# Patient Record
Sex: FEMALE | Race: BLACK OR AFRICAN AMERICAN | Employment: UNEMPLOYED | URBAN - METROPOLITAN AREA
[De-identification: names, ages, dates, MRNs, and addresses within clinical notes are randomized per-mention and may not be internally consistent; named-entity substitution may affect disease eponyms.]

---

## 2018-07-26 ENCOUNTER — OP HISTORICAL/CONVERTED ENCOUNTER (OUTPATIENT)
Dept: OTHER | Age: 73
End: 2018-07-26

## 2019-02-10 ENCOUNTER — ED HISTORICAL/CONVERTED ENCOUNTER (OUTPATIENT)
Dept: OTHER | Age: 74
End: 2019-02-10

## 2019-05-09 ENCOUNTER — IP HISTORICAL/CONVERTED ENCOUNTER (OUTPATIENT)
Dept: OTHER | Age: 74
End: 2019-05-09

## 2019-09-13 ENCOUNTER — ED HISTORICAL/CONVERTED ENCOUNTER (OUTPATIENT)
Dept: OTHER | Age: 74
End: 2019-09-13

## 2020-01-01 ENCOUNTER — OP HISTORICAL/CONVERTED ENCOUNTER (OUTPATIENT)
Dept: OTHER | Age: 75
End: 2020-01-01

## 2020-01-01 ENCOUNTER — APPOINTMENT (OUTPATIENT)
Dept: GENERAL RADIOLOGY | Age: 75
End: 2020-01-01
Attending: EMERGENCY MEDICINE
Payer: MEDICARE

## 2020-01-01 ENCOUNTER — ANESTHESIA (OUTPATIENT)
Dept: CARDIAC CATH/INVASIVE PROCEDURES | Age: 75
End: 2020-01-01
Payer: MEDICARE

## 2020-01-01 ENCOUNTER — OFFICE VISIT (OUTPATIENT)
Dept: SURGERY | Age: 75
End: 2020-01-01
Payer: MEDICARE

## 2020-01-01 ENCOUNTER — HOSPITAL ENCOUNTER (OUTPATIENT)
Age: 75
End: 2020-11-15
Attending: EMERGENCY MEDICINE | Admitting: HOSPITALIST
Payer: MEDICARE

## 2020-01-01 ENCOUNTER — ANESTHESIA EVENT (OUTPATIENT)
Dept: CARDIAC CATH/INVASIVE PROCEDURES | Age: 75
End: 2020-01-01
Payer: MEDICARE

## 2020-01-01 ENCOUNTER — HOSPITAL ENCOUNTER (EMERGENCY)
Age: 75
Discharge: ANOTHER HOSPITAL | End: 2020-11-15
Attending: EMERGENCY MEDICINE
Payer: MEDICARE

## 2020-01-01 VITALS
TEMPERATURE: 98 F | HEIGHT: 65 IN | SYSTOLIC BLOOD PRESSURE: 140 MMHG | WEIGHT: 154 LBS | DIASTOLIC BLOOD PRESSURE: 84 MMHG | BODY MASS INDEX: 25.66 KG/M2 | HEART RATE: 70 BPM | OXYGEN SATURATION: 98 %

## 2020-01-01 VITALS
OXYGEN SATURATION: 99 % | TEMPERATURE: 99 F | RESPIRATION RATE: 18 BRPM | WEIGHT: 155 LBS | DIASTOLIC BLOOD PRESSURE: 50 MMHG | HEART RATE: 84 BPM | SYSTOLIC BLOOD PRESSURE: 69 MMHG

## 2020-01-01 VITALS — SYSTOLIC BLOOD PRESSURE: 141 MMHG | HEART RATE: 87 BPM | RESPIRATION RATE: 16 BRPM | DIASTOLIC BLOOD PRESSURE: 115 MMHG

## 2020-01-01 DIAGNOSIS — I65.29 STENOSIS OF CAROTID ARTERY, UNSPECIFIED LATERALITY: Primary | ICD-10-CM

## 2020-01-01 DIAGNOSIS — I21.3 ST ELEVATION MYOCARDIAL INFARCTION (STEMI), UNSPECIFIED ARTERY (HCC): ICD-10-CM

## 2020-01-01 DIAGNOSIS — I21.11 ACUTE ST ELEVATION MYOCARDIAL INFARCTION (STEMI) INVOLVING RIGHT CORONARY ARTERY (HCC): Primary | ICD-10-CM

## 2020-01-01 LAB
ALBUMIN SERPL-MCNC: 3.3 G/DL (ref 3.5–5)
ALBUMIN/GLOB SERPL: 1 {RATIO} (ref 1.1–2.2)
ALP SERPL-CCNC: 95 U/L (ref 45–117)
ALT SERPL-CCNC: 22 U/L (ref 12–78)
ANION GAP SERPL CALC-SCNC: 13 MMOL/L (ref 5–15)
APTT PPP: 140.9 SEC (ref 23–35.7)
AST SERPL W P-5'-P-CCNC: 24 U/L (ref 15–37)
BASOPHILS # BLD: 0 K/UL (ref 0–0.1)
BASOPHILS # BLD: 0.1 K/UL (ref 0–0.2)
BASOPHILS NFR BLD: 0 % (ref 0–1)
BASOPHILS NFR BLD: 1 % (ref 0–2.5)
BILIRUB SERPL-MCNC: 0.2 MG/DL (ref 0.2–1)
BNP SERPL-MCNC: 388 PG/ML
BNP SERPL-MCNC: 392 PG/ML
BUN SERPL-MCNC: 16 MG/DL (ref 6–20)
BUN/CREAT SERPL: 9 (ref 12–20)
CA-I BLD-MCNC: 9 MG/DL (ref 8.5–10.1)
CHLORIDE SERPL-SCNC: 103 MMOL/L (ref 97–108)
CO2 SERPL-SCNC: 22 MMOL/L (ref 21–32)
CREAT SERPL-MCNC: 1.7 MG/DL (ref 0.55–1.02)
DIFFERENTIAL METHOD BLD: ABNORMAL
EOSINOPHIL # BLD: 0.1 K/UL (ref 0–0.4)
EOSINOPHIL # BLD: 0.1 K/UL (ref 0–0.7)
EOSINOPHIL NFR BLD: 1 % (ref 0–7)
EOSINOPHIL NFR BLD: 2 % (ref 0.9–2.9)
ERYTHROCYTE [DISTWIDTH] IN BLOOD BY AUTOMATED COUNT: 13.5 % (ref 11.5–14.5)
ERYTHROCYTE [DISTWIDTH] IN BLOOD BY AUTOMATED COUNT: 13.5 % (ref 11.5–14.5)
GLOBULIN SER CALC-MCNC: 3.4 G/DL (ref 2–4)
GLUCOSE SERPL-MCNC: 213 MG/DL (ref 65–100)
HCT VFR BLD AUTO: 32.7 % (ref 35–47)
HCT VFR BLD AUTO: 36.1 % (ref 36–46)
HGB BLD-MCNC: 10 G/DL (ref 11.5–16)
HGB BLD-MCNC: 11.6 G/DL (ref 13.5–17.5)
IMM GRANULOCYTES # BLD AUTO: 0 K/UL (ref 0–0.04)
IMM GRANULOCYTES NFR BLD AUTO: 0 % (ref 0–0.5)
INR PPP: 1 (ref 0.9–1.1)
INR PPP: 1.1 (ref 0.9–1.1)
LYMPHOCYTES # BLD: 3.4 K/UL (ref 0.8–3.5)
LYMPHOCYTES # BLD: 3.4 K/UL (ref 1–4.8)
LYMPHOCYTES NFR BLD: 34 % (ref 12–49)
LYMPHOCYTES NFR BLD: 48 % (ref 20.5–51.1)
MCH RBC QN AUTO: 27.2 PG (ref 31–34)
MCH RBC QN AUTO: 27.4 PG (ref 26–34)
MCHC RBC AUTO-ENTMCNC: 30.6 G/DL (ref 30–36.5)
MCHC RBC AUTO-ENTMCNC: 32.2 G/DL (ref 31–36)
MCV RBC AUTO: 84.5 FL (ref 80–100)
MCV RBC AUTO: 89.6 FL (ref 80–99)
MONOCYTES # BLD: 0.3 K/UL (ref 0.2–2.4)
MONOCYTES # BLD: 0.3 K/UL (ref 0–1)
MONOCYTES NFR BLD: 3 % (ref 5–13)
MONOCYTES NFR BLD: 4 % (ref 1.7–9.3)
NEUTS SEG # BLD: 3.2 K/UL (ref 1.8–7.7)
NEUTS SEG # BLD: 6.3 K/UL (ref 1.8–8)
NEUTS SEG NFR BLD: 45 % (ref 42–75)
NEUTS SEG NFR BLD: 62 % (ref 32–75)
NRBC # BLD: 0.01 K/UL
NRBC BLD-RTO: 10 PER 100 WBC
PLATELET # BLD AUTO: 188 K/UL (ref 150–400)
PLATELET # BLD AUTO: 223 K/UL
PMV BLD AUTO: 10.5 FL (ref 8.9–12.9)
PMV BLD AUTO: 8.8 FL (ref 6.5–11.5)
POTASSIUM SERPL-SCNC: 3.4 MMOL/L (ref 3.5–5.1)
PROT SERPL-MCNC: 6.7 G/DL (ref 6.4–8.2)
PROTHROMBIN TIME: 14.4 SEC (ref 11.9–14.7)
PROTHROMBIN TIME: 9.8 SEC (ref 9–11.1)
RBC # BLD AUTO: 3.65 M/UL (ref 3.8–5.2)
RBC # BLD AUTO: 4.27 M/UL (ref 4.5–5.9)
SODIUM SERPL-SCNC: 138 MMOL/L (ref 136–145)
THERAPEUTIC RANGE,PTTT: ABNORMAL SEC (ref 68–109)
TROPONIN I SERPL-MCNC: 0.33 NG/ML
WBC # BLD AUTO: 10.1 K/UL (ref 3.6–11)
WBC # BLD AUTO: 7.1 K/UL (ref 4.4–11.3)

## 2020-01-01 PROCEDURE — C1769 GUIDE WIRE: HCPCS | Performed by: INTERNAL MEDICINE

## 2020-01-01 PROCEDURE — C1874 STENT, COATED/COV W/DEL SYS: HCPCS | Performed by: INTERNAL MEDICINE

## 2020-01-01 PROCEDURE — 74011250636 HC RX REV CODE- 250/636: Performed by: EMERGENCY MEDICINE

## 2020-01-01 PROCEDURE — 85730 THROMBOPLASTIN TIME PARTIAL: CPT

## 2020-01-01 PROCEDURE — 80053 COMPREHEN METABOLIC PANEL: CPT

## 2020-01-01 PROCEDURE — 76060000031 HC ANESTHESIA FIRST 0.5 HR

## 2020-01-01 PROCEDURE — C1887 CATHETER, GUIDING: HCPCS | Performed by: INTERNAL MEDICINE

## 2020-01-01 PROCEDURE — 93454 CORONARY ARTERY ANGIO S&I: CPT | Performed by: INTERNAL MEDICINE

## 2020-01-01 PROCEDURE — 77030008814 HC VLV ACC PLUS MRTM -B: Performed by: INTERNAL MEDICINE

## 2020-01-01 PROCEDURE — 84484 ASSAY OF TROPONIN QUANT: CPT

## 2020-01-01 PROCEDURE — 93005 ELECTROCARDIOGRAM TRACING: CPT

## 2020-01-01 PROCEDURE — 85025 COMPLETE CBC W/AUTO DIFF WBC: CPT

## 2020-01-01 PROCEDURE — 31500 INSERT EMERGENCY AIRWAY: CPT

## 2020-01-01 PROCEDURE — 99284 EMERGENCY DEPT VISIT MOD MDM: CPT

## 2020-01-01 PROCEDURE — 76210000063 HC OR PH I REC FIRST 0.5 HR: Performed by: INTERNAL MEDICINE

## 2020-01-01 PROCEDURE — 77030025703 HC SYR ANGI VACLOK MRTM -A: Performed by: INTERNAL MEDICINE

## 2020-01-01 PROCEDURE — 83880 ASSAY OF NATRIURETIC PEPTIDE: CPT

## 2020-01-01 PROCEDURE — 77030016707 HC CATH ANGI DX SUPT1 CARD -B: Performed by: INTERNAL MEDICINE

## 2020-01-01 PROCEDURE — C1894 INTRO/SHEATH, NON-LASER: HCPCS | Performed by: INTERNAL MEDICINE

## 2020-01-01 PROCEDURE — 77030013516 HC DEV INFL ANGI MRTM -B: Performed by: INTERNAL MEDICINE

## 2020-01-01 PROCEDURE — 77030008542 HC TBNG MON PRSS EDWD -A: Performed by: INTERNAL MEDICINE

## 2020-01-01 PROCEDURE — 85347 COAGULATION TIME ACTIVATED: CPT

## 2020-01-01 PROCEDURE — 36415 COLL VENOUS BLD VENIPUNCTURE: CPT

## 2020-01-01 PROCEDURE — 74011000258 HC RX REV CODE- 258: Performed by: INTERNAL MEDICINE

## 2020-01-01 PROCEDURE — 85610 PROTHROMBIN TIME: CPT

## 2020-01-01 PROCEDURE — 76210000020 HC REC RM PH II FIRST 0.5 HR: Performed by: INTERNAL MEDICINE

## 2020-01-01 PROCEDURE — 74011000250 HC RX REV CODE- 250: Performed by: INTERNAL MEDICINE

## 2020-01-01 PROCEDURE — 76060000031 HC ANESTHESIA FIRST 0.5 HR: Performed by: INTERNAL MEDICINE

## 2020-01-01 PROCEDURE — 99153 MOD SED SAME PHYS/QHP EA: CPT | Performed by: INTERNAL MEDICINE

## 2020-01-01 PROCEDURE — 74011250636 HC RX REV CODE- 250/636: Performed by: INTERNAL MEDICINE

## 2020-01-01 PROCEDURE — 92941 PRQ TRLML REVSC TOT OCCL AMI: CPT | Performed by: INTERNAL MEDICINE

## 2020-01-01 PROCEDURE — C1725 CATH, TRANSLUMIN NON-LASER: HCPCS | Performed by: INTERNAL MEDICINE

## 2020-01-01 PROCEDURE — 92950 HEART/LUNG RESUSCITATION CPR: CPT | Performed by: INTERNAL MEDICINE

## 2020-01-01 PROCEDURE — 77030019698 HC SYR ANGI MDLON MRTM -A: Performed by: INTERNAL MEDICINE

## 2020-01-01 PROCEDURE — 76060000032 HC ANESTHESIA 0.5 TO 1 HR: Performed by: INTERNAL MEDICINE

## 2020-01-01 PROCEDURE — 99152 MOD SED SAME PHYS/QHP 5/>YRS: CPT | Performed by: INTERNAL MEDICINE

## 2020-01-01 PROCEDURE — 99203 OFFICE O/P NEW LOW 30 MIN: CPT | Performed by: SURGERY

## 2020-01-01 DEVICE — STENT COR DES 2.50X15M -- DES RESOLUTE ONYX: Type: IMPLANTABLE DEVICE | Status: FUNCTIONAL

## 2020-01-01 RX ORDER — ATORVASTATIN CALCIUM 40 MG/1
TABLET, FILM COATED ORAL DAILY
COMMUNITY

## 2020-01-01 RX ORDER — LEVETIRACETAM 500 MG/1
TABLET ORAL 2 TIMES DAILY
COMMUNITY

## 2020-01-01 RX ORDER — GUAIFENESIN 100 MG/5ML
81 LIQUID (ML) ORAL DAILY
COMMUNITY

## 2020-01-01 RX ORDER — AMLODIPINE BESYLATE 5 MG/1
5 TABLET ORAL DAILY
COMMUNITY

## 2020-01-01 RX ORDER — DOPAMINE HYDROCHLORIDE 160 MG/100ML
INJECTION, SOLUTION INTRAVENOUS
Status: COMPLETED | OUTPATIENT
Start: 2020-01-01 | End: 2020-01-01

## 2020-01-01 RX ORDER — LEVETIRACETAM 500 MG/1
TABLET ORAL
COMMUNITY
Start: 2020-01-01

## 2020-01-01 RX ORDER — LIDOCAINE HYDROCHLORIDE 10 MG/ML
INJECTION INFILTRATION; PERINEURAL AS NEEDED
Status: DISCONTINUED | OUTPATIENT
Start: 2020-01-01 | End: 2020-11-16 | Stop reason: HOSPADM

## 2020-01-01 RX ORDER — ATENOLOL 25 MG/1
25 TABLET ORAL DAILY
COMMUNITY

## 2020-01-01 RX ORDER — ATORVASTATIN CALCIUM 40 MG/1
TABLET, FILM COATED ORAL
COMMUNITY
Start: 2020-01-01

## 2020-01-01 RX ORDER — HEPARIN SODIUM 10000 [USP'U]/100ML
12-25 INJECTION, SOLUTION INTRAVENOUS CONTINUOUS
Status: DISCONTINUED | OUTPATIENT
Start: 2020-01-01 | End: 2020-01-01 | Stop reason: HOSPADM

## 2020-01-01 RX ORDER — MIDAZOLAM HYDROCHLORIDE 1 MG/ML
INJECTION, SOLUTION INTRAMUSCULAR; INTRAVENOUS AS NEEDED
Status: DISCONTINUED | OUTPATIENT
Start: 2020-01-01 | End: 2020-11-16 | Stop reason: HOSPADM

## 2020-01-01 RX ORDER — HEPARIN SODIUM 5000 [USP'U]/ML
4000 INJECTION, SOLUTION INTRAVENOUS; SUBCUTANEOUS
Status: COMPLETED | OUTPATIENT
Start: 2020-01-01 | End: 2020-01-01

## 2020-01-01 RX ORDER — FENTANYL CITRATE 50 UG/ML
INJECTION, SOLUTION INTRAMUSCULAR; INTRAVENOUS AS NEEDED
Status: DISCONTINUED | OUTPATIENT
Start: 2020-01-01 | End: 2020-11-16 | Stop reason: HOSPADM

## 2020-01-01 RX ADMIN — SODIUM CHLORIDE 500 ML: 9 INJECTION, SOLUTION INTRAVENOUS at 21:19

## 2020-01-01 RX ADMIN — HEPARIN SODIUM 4000 UNITS: 5000 INJECTION INTRAVENOUS; SUBCUTANEOUS at 21:17

## 2020-08-14 PROBLEM — I65.29 CAROTID ARTERY STENOSIS: Status: ACTIVE | Noted: 2020-01-01

## 2020-08-14 NOTE — PROGRESS NOTES
VASCULAR FOLLOW UP Subjective: CHIEF COMPLAINTS: 
TIA with amaurosis fugax. PRESENTATION OF ILLNESS: 
Ms. Jane Torres had developed visual disturbance back in 2020. Patient was recently had a work-up done. Patient was told that she had a mini stroke. She is doing okay she still having some occasional blurred visions on the right. She says some sort of  veil with a dark spot on the right eye and this occurred. Patient denies any arm weakness. Patient denies any speech problem and this occurred. Otherwise she is very active and healthy person. Patient's father  with an MI. Past Medical History:  
Diagnosis Date  Essential hypertension  Hyperlipidemia  Seizures ([facility] [address] History reviewed. No pertinent surgical history. No family history on file. Social History Tobacco Use  Smoking status: Never Smoker  Smokeless tobacco: Never Used Substance Use Topics  Alcohol use: Yes Frequency: Monthly or less Comment: once or twice a month if that Prior to Admission medications Medication Sig Start Date End Date Taking? Authorizing Provider  
atorvastatin (LIPITOR) 40 mg tablet TAKE 1 TABLET BY MOUTH EVERY DAY 20   Provider, Historical  
levETIRAcetam (KEPPRA) 500 mg tablet  20   Provider, Historical  
DOCOSAHEXANOIC ACID/EPA (FISH OIL PO) Take  by mouth. Provider, Historical  
aspirin (ASPIRIN) 325 mg tablet Take 325 mg by mouth daily. Provider, Historical  
cloNIDine (CATAPRES) 0.1 mg tablet Take  by mouth as needed. Provider, Historical  
pravastatin (PRAVACHOL) 40 mg tablet Take 40 mg by mouth nightly. Provider, Historical  
atenolol (TENORMIN) 25 mg tablet Take 25 mg by mouth daily. Provider, Historical  
amlodipine (NORVASC) 5 mg tablet Take 5 mg by mouth daily. Provider, Historical  
ergocalciferol (VITAMIN D) 50,000 unit capsule Take 50,000 Units by mouth. Provider, Historical  
 
Allergies Allergen Reactions  Seafood [Shellfish Containing Products] Hives  Nickel Other (comments) Turns skin green around the jewelry  Statins-Hmg-Coa Reductase Inhibitors Other (comments) Leg aches Review of Systems: I reviewed the rest of organ systems personally and they were negative signed by Dr. Sheryl Siddiqui Objective:  
 
Visit Vitals /84 (BP 1 Location: Right arm, BP Patient Position: Sitting) Pulse 70 Temp 98 °F (36.7 °C) (Oral) Ht 5' 5\" (1.651 m) Wt 154 lb (69.9 kg) SpO2 98% BMI 25.63 kg/m² VITAL SIGNS REVIEWED. Physical Exam: 
Patient is well-nourished pleasant in conversation is appropriate. Head and neck examination atraumatic, normocephalic. Gaze appropriate. Conversation appropriate. Neck examination shows supple. No mass. No obvious carotid bruit. Chest examination shows lungs are clear bilaterally well-expanded, no crackles or wheezes. Cardiovascular system regular rate, no obvious murmur. Skin warm to touch  and moist, no skin lesions. Abdomen is soft ,not tender or distended bowel sounds present. No palpable mass. Neurological examinations, no focal neuro deficits moving all 4 extremities. Cranial nerves intact. Sensation is intact as well. Hematologic: No obvious bruise or swelling or obvious lymphadenopathy. Psychosocial: Appropriate. Has good effect. Musculoskeletal system: No muscle wasting, appropriate movements upper and lower extremity. Vascular examination: Not examined on lower extremity. Data Review:  
No visits with results within 1 Month(s) from this visit. Latest known visit with results is:  
Office Visit on 08/19/2014 Component Date Value Ref Range Status  T4, Free 08/19/2014 1.17  0.82 - 1.77 ng/dL Final  
 TSH 08/19/2014 1.220  0.450 - 4.500 uIU/mL Final  
  
 
Assessment:  
 
Problem List Items Addressed This Visit Circulatory Carotid artery stenosis - Primary Plan: I discussed with the patient's possible symptoms are related to TIA in the setting of a carotid stenosis. Unfortunately I do not have any imaging studies from [facility] for to review. So I need to contact [facility] and send the order MRIs and carotid duplex ultrasound that was performed make further recommendations.  
 
 
 
Lovely Colbert MD

## 2020-11-15 NOTE — Clinical Note
Lesion located in the Mid LAD. Balloon inserted. Balloon inflated using multiple inflations inflation technique. Lesion #1: Pressure = 8 ozzy; Duration = 15 sec. Inflation 2: Pressure = 8 ozzy; Duration = 7 sec. Inflation 3: Pressure = 8 ozzy; Duration = 5 sec. Inflation 4: Pressure = 8 ozzy; Duration = 10 sec.

## 2020-11-15 NOTE — Clinical Note
Lesion: Located in the Mid LAD. Multiple inflations used. First inflation pressure = 14 ozzy; inflation time: 5 sec. Second inflation pressure: 14 ozzy; inflation time: 5 sec.

## 2020-11-15 NOTE — Clinical Note
Lesion located in the Mid LAD. Balloon inflated using single inflation technique. Lesion #1: Pressure = 10 ozzy; Duration = 10 sec.

## 2020-11-16 PROBLEM — I21.02 STEMI INVOLVING LEFT ANTERIOR DESCENDING CORONARY ARTERY (HCC): Status: ACTIVE | Noted: 2020-11-16

## 2020-11-16 LAB
ACT BLD: 0 SEC (ref 74–125)
ALBUMIN SERPL-MCNC: 2.9 G/DL (ref 3.5–5)
ALBUMIN/GLOB SERPL: 1 {RATIO} (ref 1.1–2.2)
ALP SERPL-CCNC: 91 U/L (ref 45–117)
ALT SERPL-CCNC: 20 U/L (ref 12–78)
ANION GAP SERPL CALC-SCNC: 16 MMOL/L (ref 5–15)
AST SERPL W P-5'-P-CCNC: 23 U/L (ref 15–37)
ATRIAL RATE: 84 BPM
ATRIAL RATE: 85 BPM
BILIRUB SERPL-MCNC: 0.2 MG/DL (ref 0.2–1)
BUN SERPL-MCNC: 17 MG/DL (ref 6–20)
BUN/CREAT SERPL: 9 (ref 12–20)
CA-I BLD-MCNC: 8.2 MG/DL (ref 8.5–10.1)
CALCULATED P AXIS, ECG09: 63 DEGREES
CALCULATED P AXIS, ECG09: 71 DEGREES
CALCULATED R AXIS, ECG10: -99 DEGREES
CALCULATED R AXIS, ECG10: 48 DEGREES
CALCULATED T AXIS, ECG11: 173 DEGREES
CALCULATED T AXIS, ECG11: 24 DEGREES
CHLORIDE SERPL-SCNC: 109 MMOL/L (ref 97–108)
CO2 SERPL-SCNC: 18 MMOL/L (ref 21–32)
CREAT SERPL-MCNC: 1.85 MG/DL (ref 0.55–1.02)
DIAGNOSIS, 93000: NORMAL
DIAGNOSIS, 93000: NORMAL
GLOBULIN SER CALC-MCNC: 3 G/DL (ref 2–4)
GLUCOSE SERPL-MCNC: 248 MG/DL (ref 65–100)
P-R INTERVAL, ECG05: 178 MS
P-R INTERVAL, ECG05: 178 MS
PERFORMED BY, TECHID: ABNORMAL
POTASSIUM SERPL-SCNC: 3.2 MMOL/L (ref 3.5–5.1)
PROT SERPL-MCNC: 5.9 G/DL (ref 6.4–8.2)
Q-T INTERVAL, ECG07: 429 MS
Q-T INTERVAL, ECG07: 468 MS
QRS DURATION, ECG06: 160 MS
QRS DURATION, ECG06: 172 MS
QTC CALCULATION (BEZET), ECG08: 508 MS
QTC CALCULATION (BEZET), ECG08: 553 MS
SODIUM SERPL-SCNC: 143 MMOL/L (ref 136–145)
TROPONIN I SERPL-MCNC: 0.38 NG/ML
VENTRICULAR RATE, ECG03: 84 BPM
VENTRICULAR RATE, ECG03: 84 BPM

## 2020-11-16 PROCEDURE — 74011250636 HC RX REV CODE- 250/636: Performed by: INTERNAL MEDICINE

## 2020-11-16 RX ORDER — HEPARIN SODIUM 200 [USP'U]/100ML
INJECTION, SOLUTION INTRAVENOUS
Status: COMPLETED | OUTPATIENT
Start: 2020-11-16 | End: 2020-11-16

## 2020-11-16 RX ORDER — PHENYLEPHRINE HCL IN 0.9% NACL 1 MG/10 ML
SYRINGE (ML) INTRAVENOUS AS NEEDED
Status: DISCONTINUED | OUTPATIENT
Start: 2020-01-01 | End: 2020-11-16 | Stop reason: HOSPADM

## 2020-11-16 RX ORDER — ATROPINE SULFATE 0.4 MG/ML
INJECTION, SOLUTION ENDOTRACHEAL; INTRAMEDULLARY; INTRAMUSCULAR; INTRAVENOUS; SUBCUTANEOUS AS NEEDED
Status: DISCONTINUED | OUTPATIENT
Start: 2020-01-01 | End: 2020-11-16 | Stop reason: HOSPADM

## 2020-11-16 RX ORDER — SODIUM CHLORIDE 9 MG/ML
INJECTION, SOLUTION INTRAVENOUS
Status: COMPLETED | OUTPATIENT
Start: 2020-01-01 | End: 2020-01-01

## 2020-11-16 RX ORDER — DEXTROSE 50 % IN WATER (D50W) INTRAVENOUS SYRINGE
AS NEEDED
Status: DISCONTINUED | OUTPATIENT
Start: 2020-01-01 | End: 2020-11-16 | Stop reason: HOSPADM

## 2020-11-16 RX ORDER — HEPARIN SODIUM 1000 [USP'U]/ML
INJECTION, SOLUTION INTRAVENOUS; SUBCUTANEOUS AS NEEDED
Status: DISCONTINUED | OUTPATIENT
Start: 2020-01-01 | End: 2020-11-16 | Stop reason: HOSPADM

## 2020-11-16 RX ORDER — NALOXONE HYDROCHLORIDE 0.4 MG/ML
INJECTION, SOLUTION INTRAMUSCULAR; INTRAVENOUS; SUBCUTANEOUS AS NEEDED
Status: DISCONTINUED | OUTPATIENT
Start: 2020-01-01 | End: 2020-11-16 | Stop reason: HOSPADM

## 2020-11-16 NOTE — H&P
History and Physical 
 
 
69-year-old female with history of diabetes and hypertension presented to the referring facility emergency room at [facility] with chest pain started an hour prior to the presentation. She complained of severe sharp chest pain associated with shortness of breath. On presentation she found with severe diaphoresis. Was given aspirin by the emergency crew, transferred to our hospital for an emergency cardiac catheterization as suspected STEMI with anterior wall involvement. Patient was taken to the cardiac Cath Lab, found with left main dissection and subtotal proximal LAD. She had an angioplasty for mid LAD, but patient became hypotensive and hypoxic. She was intubated immediately, and went into cardiac arrest.  LOUIS BARAHONA was called and followed with the resuscitation procedure. But patient did not make it  at 23:31 on 11/15/2020. As patient was taken to the Cardiac catheterization directly, I did not get a chance to see the patient.  But I am supposed to be the admitting MD.

## 2020-11-16 NOTE — CONSULTS
Cardiology Consult NAME: Bradley Guzman :  1945 MRN:  [id number] Date/Time:  11/15/2020 11:31 PM 
 
Patient PCP: Dimitri Keys MD 
________________________________________________________________________ Assessment/Plan: Anterior wall STEMI, patient is brought to the Cath Lab for primary PCI Hypertension Diabetes []        High complexity decision making was performed Subjective: CHIEF COMPLAINT:  
 
Chest pain REASON FOR CONSULT: 
Anterior wall STEMI 
 
HISTORY OF PRESENT ILLNESS:    
Og Pina is a 67 y.o. BLACK OR  female who presented to [facility] emergency room with chest pain of 1 hour duration. Patient was diagnosed with an anterior wall MI and transferred for emergent PCI. Patient was brought to the Cath Lab. A catheterization showed dissected left main, subtotal proximal LAD. Patient became hypotensive and hypoxic. Was intubated. Did angioplasty the mid LAD. Did initiate CPR. The LAD in its midportion. Patient did not respond to resuscitative measures. History of hypertension and possibly diabetes with elevated blood sugar. Unable to obtain any history from patient History reviewed. No pertinent past medical history. History reviewed. No pertinent surgical history. No Known Allergies Meds:  See below Social History Tobacco Use  Smoking status: Never Smoker  Smokeless tobacco: Never Used Substance Use Topics  Alcohol use: Not on file History reviewed. No pertinent family history. REVIEW OF SYSTEMS:   
 [x]         Unable to obtain  ROS []         Total of 12 systems reviewed as follows: 
 
Constitutional: negative fever, negative chills, negative weight loss Eyes:   negative visual changes ENT:   negative sore throat, tongue or lip swelling Respiratory:  negative cough, negative dyspnea Cards:  negative for chest pain, palpitations, lower extremity edema GI:   negative for nausea, vomiting, diarrhea, and abdominal pain Genitourinary: negative for frequency, dysuria Integument:  negative for rash Hematologic:  negative for easy bruising and gum/nose bleeding Musculoskel: negative for myalgias,  back pain Neurological:  negative for headaches, dizziness, vertigo, weakness Behavl/Psych: negative for feelings of anxiety, depression Pertinent Positives include : 
 
Objective:  
  
Physical Exam: 
 
Last 24hrs VS reviewed since prior progress note. Most recent are: 
 
Visit Vitals BP (!) 141/115 Pulse 87 Resp 16 No intake or output data in the 24 hours ending 11/15/20 2331 General Appearance: Middle-aged female in severe distress. Ears/Nose/Mouth/Throat: Pupils equal and round, Neck: Supple. JVP within normal limits. Carotids good upstrokes, with no bruit. Chest: Lungs clear to auscultation bilaterally. Cardiovascular: Regular rate and rhythm, S1S2 normal, no murmur, rubs, gallops. Abdomen: Soft, non-tender, bowel sounds are active. No organomegaly. Extremities: No edema bilaterally. Femoral pulses +2, Distal Pulses +1. Skin: Warm and dry. Neuro: Moves all 4 limbs Psychiatric: Uncooperative 
[]         Post-cath site without hematoma, bruit, tenderness, or thrill. Distal pulses intact. Data:  
  
Telemetry: 
 
EKG: 
[]  No new EKG for review. Anterior wall MI 
 
Prior to Admission medications Medication Sig Start Date End Date Taking? Authorizing Provider  
atorvastatin (LIPITOR) 40 mg tablet Take  by mouth daily. Timbo Morales MD  
Cetirizine 10 mg cap Take  by mouth. Timbo Morales MD  
levETIRAcetam (Keppra) 500 mg tablet Take  by mouth two (2) times a day. Timbo Morales MD  
atenoloL (TENORMIN) 25 mg tablet Take 25 mg by mouth daily. Timbo Morales MD  
amLODIPine (NORVASC) 5 mg tablet Take 5 mg by mouth daily. Timbo Morales MD  
aspirin 81 mg chewable tablet Take 81 mg by mouth daily.     Timbo Morales MD  
 
 
 Recent Results (from the past 24 hour(s)) CBC WITH AUTOMATED DIFF Collection Time: 11/15/20  8:40 PM  
Result Value Ref Range WBC 7.1 4.4 - 11.3 K/uL  
 RBC 4.27 (L) 4.50 - 5.90 M/uL  
 HGB 11.6 (L) 13.5 - 17.5 g/dL HCT 36.1 36 - 46 % MCV 84.5 80 - 100 FL  
 MCH 27.2 (L) 31 - 34 PG  
 MCHC 32.2 31.0 - 36.0 g/dL  
 RDW 13.5 11.5 - 14.5 % PLATELET 425 K/uL MPV 8.8 6.5 - 11.5 FL  
 NRBC 10.0  WBC ABSOLUTE NRBC 0.01 K/uL NEUTROPHILS 45 42 - 75 % LYMPHOCYTES 48 20.5 - 51.1 % MONOCYTES 4 1.7 - 9.3 % EOSINOPHILS 2 0.9 - 2.9 % BASOPHILS 1 0.0 - 2.5 % ABS. NEUTROPHILS 3.2 1.8 - 7.7 K/UL  
 ABS. LYMPHOCYTES 3.4 1.0 - 4.8 K/UL  
 ABS. MONOCYTES 0.3 0.2 - 2.4 K/UL  
 ABS. EOSINOPHILS 0.1 0.0 - 0.7 K/UL  
 ABS. BASOPHILS 0.1 0.0 - 0.2 K/UL PROTHROMBIN TIME + INR Collection Time: 11/15/20  8:40 PM  
Result Value Ref Range Prothrombin time 9.8 9.0 - 11.1 sec INR 1.0 0.9 - 1.1 METABOLIC PANEL, COMPREHENSIVE Collection Time: 11/15/20  8:40 PM  
Result Value Ref Range Sodium 138 136 - 145 mmol/L Potassium 3.4 (L) 3.5 - 5.1 mmol/L Chloride 103 97 - 108 mmol/L  
 CO2 22 21 - 32 mmol/L Anion gap 13 5 - 15 mmol/L Glucose 213 (H) 65 - 100 mg/dL BUN 16 6 - 20 mg/dL Creatinine 1.70 (H) 0.55 - 1.02 mg/dL BUN/Creatinine ratio 9 (L) 12 - 20 GFR est AA 35 (L) >60 ml/min/1.73m2 GFR est non-AA 29 (L) >60 ml/min/1.73m2 Calcium 9.0 8.5 - 10.1 mg/dL Bilirubin, total 0.2 0.2 - 1.0 mg/dL AST (SGOT) 24 15 - 37 U/L  
 ALT (SGPT) 22 12 - 78 U/L Alk. phosphatase 95 45 - 117 U/L Protein, total 6.7 6.4 - 8.2 g/dL Albumin 3.3 (L) 3.5 - 5.0 g/dL Globulin 3.4 2.0 - 4.0 g/dL A-G Ratio 1.0 (L) 1.1 - 2.2    
TROPONIN I Collection Time: 11/15/20  8:40 PM  
Result Value Ref Range Troponin-I, Qt. 0.33 (H) <0.05 ng/mL BNP Collection Time: 11/15/20  8:40 PM  
Result Value Ref Range NT pro- <450 pg/mL CBC WITH AUTOMATED DIFF  
 Collection Time: 11/15/20 10:30 PM  
Result Value Ref Range WBC 10.1 3.6 - 11.0 K/uL  
 RBC 3.65 (L) 3.80 - 5.20 M/uL  
 HGB 10.0 (L) 11.5 - 16.0 g/dL HCT 32.7 (L) 35.0 - 47.0 % MCV 89.6 80.0 - 99.0 FL  
 MCH 27.4 26.0 - 34.0 PG  
 MCHC 30.6 30.0 - 36.5 g/dL  
 RDW 13.5 11.5 - 14.5 % PLATELET 495 383 - 168 K/uL MPV 10.5 8.9 - 12.9 FL  
 NEUTROPHILS 62 32 - 75 % LYMPHOCYTES 34 12 - 49 % MONOCYTES 3 (L) 5 - 13 % EOSINOPHILS 1 0 - 7 % BASOPHILS 0 0 - 1 % IMMATURE GRANULOCYTES 0 0.0 - 0.5 % ABS. NEUTROPHILS 6.3 1.8 - 8.0 K/UL  
 ABS. LYMPHOCYTES 3.4 0.8 - 3.5 K/UL  
 ABS. MONOCYTES 0.3 0.0 - 1.0 K/UL  
 ABS. EOSINOPHILS 0.1 0.0 - 0.4 K/UL  
 ABS. BASOPHILS 0.0 0.0 - 0.1 K/UL  
 ABS. IMM. GRANS. 0.0 0.00 - 0.04 K/UL  
 DF AUTOMATED PROTHROMBIN TIME + INR Collection Time: 11/15/20 10:30 PM  
Result Value Ref Range Prothrombin time 14.4 11.9 - 14.7 sec INR 1.1 0.9 - 1.1 PTT Collection Time: 11/15/20 10:30 PM  
Result Value Ref Range aPTT 140.9 (HH) 23.0 - 35.7 sec  
 aPTT, therapeutic range   68 - 109 sec BNP Collection Time: 11/15/20 10:30 PM  
Result Value Ref Range NT pro- <450 pg/mL Sharmila Jiménez MD

## 2020-11-16 NOTE — ED PROVIDER NOTES
Patient presents with complaint of severe chest pain and shortness of breath, starting 1 hour ago. She has diaphoresis and shortness of breath. Patienrt was given Po aspirin en route by EMS. Duration:1 hour Intensity: 10/10 Modified by: No relief with anything. History reviewed. No pertinent past medical history. History reviewed. No pertinent surgical history. History reviewed. No pertinent family history. Social History Socioeconomic History  Marital status: Not on file Spouse name: Not on file  Number of children: Not on file  Years of education: Not on file  Highest education level: Not on file Occupational History  Not on file Social Needs  Financial resource strain: Not on file  Food insecurity Worry: Not on file Inability: Not on file  Transportation needs Medical: Not on file Non-medical: Not on file Tobacco Use  Smoking status: Not on file Substance and Sexual Activity  Alcohol use: Not on file  Drug use: Not on file  Sexual activity: Not on file Lifestyle  Physical activity Days per week: Not on file Minutes per session: Not on file  Stress: Not on file Relationships  Social connections Talks on phone: Not on file Gets together: Not on file Attends Spiritism service: Not on file Active member of club or organization: Not on file Attends meetings of clubs or organizations: Not on file Relationship status: Not on file  Intimate partner violence Fear of current or ex partner: Not on file Emotionally abused: Not on file Physically abused: Not on file Forced sexual activity: Not on file Other Topics Concern  Not on file Social History Narrative  Not on file ALLERGIES: Patient has no known allergies. Review of Systems Constitutional: Positive for fatigue. HENT: Negative. Eyes: Negative. Respiratory: Positive for chest tightness and shortness of breath. Cardiovascular: Negative. Gastrointestinal: Negative. Endocrine: Negative. Genitourinary: Negative. Skin: Negative.   
     + diaphoresis Allergic/Immunologic: Negative. Neurological: Positive for weakness. Hematological: Negative. Psychiatric/Behavioral: Negative. All other systems reviewed and are negative. Vitals:  
 11/15/20 2038 Pulse: 84 Resp: 18 Temp: 99 °F (37.2 °C) SpO2: 99% Physical Exam 
Vitals signs and nursing note reviewed. Constitutional:   
   Appearance: She is well-developed. She is ill-appearing and diaphoretic. HENT:  
   Head: Normocephalic and atraumatic. Neck: Musculoskeletal: Normal range of motion and neck supple. Cardiovascular:  
   Rate and Rhythm: Normal rate and regular rhythm. Heart sounds: Normal heart sounds. Pulmonary:  
   Breath sounds: Normal breath sounds. Abdominal:  
   General: Bowel sounds are normal.  
   Palpations: Abdomen is soft. Musculoskeletal: Normal range of motion. Neurological:  
   General: No focal deficit present. Mental Status: She is alert. Psychiatric:     
   Mood and Affect: Mood normal.     
   Behavior: Behavior normal.  
 
  
 
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 Discussed with Dr Radha Whyte at [facility] and he accepts patient as a transfer. Patient Progress Patient progress: improved Critical Care Performed by: Ryan Contreras MD 
Authorized by: Ryan Contreras MD  
 
Critical care provider statement:  
  Critical care start time:  11/15/2020 8:30 PM 
  Critical care end time:  11/15/2020 9:27 PM

## 2020-11-16 NOTE — ANESTHESIA PROCEDURE NOTES
Emergent Intubation Performed by: Jewel Laughlin CRNA Authorized by: Jewel Laughlin CRNA Emergent Intubation:  
Patient location: Cath Lab. Date/Time:  11/15/2020 11:12 PM 
Indications:  Airway compromise Spontaneous Ventilation: absent Level of Consciousness: unresponsive Preoxygenated: Yes Airway Documentation: Airway:  ETT - Cuffed Technique:  Cricoid pressure Insertion Site:  Oral 
ETT size (mm):  7.5 ETT Line Kaiser:  Gumline ETT Insertion depth (cm):  23 Placement verified by: EtCO2 and BBS Attempts:  1 Difficult airway: No

## 2020-11-16 NOTE — ED TRIAGE NOTES
Pt arrived via ems c/o chest pain for the past hour. Pt is diaphoretic and confused, clutching her chest saying that her chest is aching. Pt denies any allergies and medical hx, however did arrive with a few medications so unclear how much pt is currently aware of her medical hx.

## 2020-11-16 NOTE — ROUTINE PROCESS
TRANSFER - OUT REPORT: 
 
Verbal report given to Girma Barros on [id number]  being transferred to [facility] ED for urgent transfer Report consisted of patients Situation, Background, Assessment and  
Recommendations(SBAR). Information from the following report(s) SBAR, ED Summary and MAR was reviewed with the receiving nurse. Lines:  
Peripheral IV 11/15/20 Left Antecubital (Active) Peripheral IV 11/15/20 Anterior;Distal;Left Forearm (Active) Opportunity for questions and clarification was provided. Patient transported with: 
 Monitor O2 @ 4 liters Tech

## 2020-11-16 NOTE — PROGRESS NOTES
Spiritual Care Assessment/Progress Note [facility] 
 
 
NAME: Susan Upton      MRN: [id number] AGE: 67 y.o. SEX: female Worship Affiliation: Unknown Language: English  
 
11/16/2020     Total Time (in minutes): 60  
 
Spiritual Assessment begun in [address] [facility] CATH LAB through conversation with: 
  
    [x]Patient        [x] Family    [x] Friend(s) Reason for Consult: Death, Inpatient Spiritual beliefs: (Please include comment if needed) 
   [] Identifies with a kalpana tradition:     
   [] Supported by a kalpana community:        
   [] Claims no spiritual orientation:       
   [] Seeking spiritual identity:            
   [] Adheres to an individual form of spirituality:       
   [x] Not able to assess:                   
 
    
Identified resources for coping:  
   [] Prayer                           
   [] Music                  [] Guided Imagery 
   [] Family/friends                 [] Pet visits [] Devotional reading                         [x] Unknown 
   [] Other:                                         
 
 
Interventions offered during this visit: (See comments for more details) Patient Interventions: Prayer (actual) Family/Friend(s): Affirmation of emotions/emotional suffering, Prayer (actual), Life review/legacy, Normalization of emotional/spiritual concerns, End of life issues discussed, Worship beliefs/image of God discussed Plan of Care: 
 
 [] Support spiritual and/or cultural needs  
 [] Support AMD and/or advance care planning process    
 [] Support grieving process 
 [] Coordinate Rites and/or Rituals  
 [] Coordination with community clergy [] No spiritual needs identified at this time 
 [] Detailed Plan of Care below (See Comments)  [] Make referral to Music Therapy 
[] Make referral to Pet Therapy    
[] Make referral to Addiction services 
[] Make referral to [facility] 
[] Make referral to Spiritual Care Partner [] No future visits requested       
[x] Follow up visits as needed Comments:  responded to referral due to patients death in Cath Lab.  consulted with IDT, met patient's  Percel and brother in law at bedside.  facilitated storytelling re recent episodes that patient described as indigestion and this late evenings event which escalated to calling EMS.  and  reflected on 64 yrs of marriage, no children but several nieces and nephews to which they are very close.  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  provided a prayer of commendation for patient and for God's presence to be with  and family.  accompanied family to exit, assured of Spiritual Care support as needed.   
 
Visited by Evgeny Kim, [facility]

## 2020-11-16 NOTE — ED PROVIDER NOTES
EMERGENCY DEPARTMENT HISTORY AND PHYSICAL EXAM 
 
 
Date: 11/15/2020 Patient Name: Facundo Walker History of Presenting Illness Chief Complaint Patient presents with  Chest Pain History Provided By: Patient HPI: Yola Guzman, 67 y.o. female  presented to the emergency department as a transfer due to concerns for ST elevation MI. On arrival patient unable to answer questions only stating that she is in pain and is short of breath. Per chart review per EMS she had complained of severe chest pain or shortness of breath started around 8 PM she was noted to be diaphoretic report from sending physician was that her blood pressure was low but on arrival her blood pressure is stabilized she after fluids. She was placed on heparin drip prior to transport. Dr. Carole Echols with cardiology has seen and evaluated initial EKG want to take patient to the Cath Lab. There are no other complaints, changes, or physical findings at this time. PCP: Lizet Dogde MD 
 
Current Outpatient Medications Medication Sig Dispense Refill  atorvastatin (LIPITOR) 40 mg tablet Take  by mouth daily.  Cetirizine 10 mg cap Take  by mouth.  levETIRAcetam (Keppra) 500 mg tablet Take  by mouth two (2) times a day.  atenoloL (TENORMIN) 25 mg tablet Take 25 mg by mouth daily.  amLODIPine (NORVASC) 5 mg tablet Take 5 mg by mouth daily.  aspirin 81 mg chewable tablet Take 81 mg by mouth daily. Past History Past Medical History: 
History reviewed. No pertinent past medical history. Past Surgical History: 
History reviewed. No pertinent surgical history. Family History: 
History reviewed. No pertinent family history. Social History: 
Social History Tobacco Use  Smoking status: Never Smoker  Smokeless tobacco: Never Used Substance Use Topics  Alcohol use: Not on file  Drug use: Not on file Allergies: 
No Known Allergies Review of Systems Review of Systems Unable to perform ROS: Acuity of condition Physical Exam  
 
Physical Exam 
Vitals signs and nursing note reviewed. Constitutional:   
   General: She is in acute distress. Appearance: She is ill-appearing and toxic-appearing. HENT:  
   Head: Normocephalic and atraumatic. Neck:  
   Vascular: No JVD. Cardiovascular:  
   Rate and Rhythm: Tachycardia present. Heart sounds: Normal heart sounds. Pulmonary:  
   Effort: Tachypnea present. Breath sounds: No wheezing, rhonchi or rales. Abdominal:  
   General: Bowel sounds are normal. There is no abdominal bruit. Palpations: Abdomen is soft. Musculoskeletal:  
   Right lower leg: No edema. Left lower leg: No edema. Skin: 
   General: Skin is warm and dry. Capillary Refill: Capillary refill takes less than 2 seconds. Findings: No rash. Psychiatric:     
   Mood and Affect: Mood is anxious. Lab and Diagnostic Study Results Labs - Recent Results (from the past 12 hour(s)) CBC WITH AUTOMATED DIFF Collection Time: 11/15/20  8:40 PM  
Result Value Ref Range WBC 7.1 4.4 - 11.3 K/uL  
 RBC 4.27 (L) 4.50 - 5.90 M/uL  
 HGB 11.6 (L) 13.5 - 17.5 g/dL HCT 36.1 36 - 46 % MCV 84.5 80 - 100 FL  
 MCH 27.2 (L) 31 - 34 PG  
 MCHC 32.2 31.0 - 36.0 g/dL  
 RDW 13.5 11.5 - 14.5 % PLATELET 755 K/uL MPV 8.8 6.5 - 11.5 FL  
 NRBC 10.0  WBC ABSOLUTE NRBC 0.01 K/uL NEUTROPHILS 45 42 - 75 % LYMPHOCYTES 48 20.5 - 51.1 % MONOCYTES 4 1.7 - 9.3 % EOSINOPHILS 2 0.9 - 2.9 % BASOPHILS 1 0.0 - 2.5 % ABS. NEUTROPHILS 3.2 1.8 - 7.7 K/UL  
 ABS. LYMPHOCYTES 3.4 1.0 - 4.8 K/UL  
 ABS. MONOCYTES 0.3 0.2 - 2.4 K/UL  
 ABS. EOSINOPHILS 0.1 0.0 - 0.7 K/UL  
 ABS. BASOPHILS 0.1 0.0 - 0.2 K/UL PROTHROMBIN TIME + INR Collection Time: 11/15/20  8:40 PM  
Result Value Ref Range Prothrombin time 9.8 9.0 - 11.1 sec INR 1.0 0.9 - 1.1 METABOLIC PANEL, COMPREHENSIVE Collection Time: 11/15/20  8:40 PM  
Result Value Ref Range Sodium 138 136 - 145 mmol/L Potassium 3.4 (L) 3.5 - 5.1 mmol/L Chloride 103 97 - 108 mmol/L  
 CO2 22 21 - 32 mmol/L Anion gap 13 5 - 15 mmol/L Glucose 213 (H) 65 - 100 mg/dL BUN 16 6 - 20 mg/dL Creatinine 1.70 (H) 0.55 - 1.02 mg/dL BUN/Creatinine ratio 9 (L) 12 - 20 GFR est AA 35 (L) >60 ml/min/1.73m2 GFR est non-AA 29 (L) >60 ml/min/1.73m2 Calcium 9.0 8.5 - 10.1 mg/dL Bilirubin, total 0.2 0.2 - 1.0 mg/dL AST (SGOT) 24 15 - 37 U/L  
 ALT (SGPT) 22 12 - 78 U/L Alk. phosphatase 95 45 - 117 U/L Protein, total 6.7 6.4 - 8.2 g/dL Albumin 3.3 (L) 3.5 - 5.0 g/dL Globulin 3.4 2.0 - 4.0 g/dL A-G Ratio 1.0 (L) 1.1 - 2.2    
TROPONIN I Collection Time: 11/15/20  8:40 PM  
Result Value Ref Range Troponin-I, Qt. 0.33 (H) <0.05 ng/mL BNP Collection Time: 11/15/20  8:40 PM  
Result Value Ref Range NT pro- <450 pg/mL Radiologic Studies -  
[unfilled] CT Results  (Last 48 hours) None CXR Results  (Last 48 hours) None Medical Decision Making and ED Course - I am the first and primary provider for this patient. - I reviewed the vital signs, available nursing notes, past medical history, past surgical history, family history and social history. - Initial assessment performed. The patients presenting problems have been discussed, and the staff are in agreement with the care plan formulated and outlined with them. I have encouraged them to ask questions as they arise throughout their visit. Vital Signs-Reviewed the patient's vital signs. Patient Vitals for the past 12 hrs: 
 Pulse Resp BP  
11/15/20 2224 87 16 (!) 141/115 Records Reviewed: Nursing Notes and Old Medical Records ED Course:  
Patient was brought in by EMS.   Initial evaluation of EKG showing T wave inversions in lateral leads and right bundle branch. Dr. Karime Brooks cardiology at bedside will take patient emergently to Cath Lab. Provider Notes (Medical Decision Making):  
67-year-old female presenting to the ED briefly. She was taken emergently to Cath Lab. MDM Disposition Disposition:  
 
Admit Diagnosis Clinical Impression: 1. ST elevation myocardial infarction (STEMI), unspecified artery ([address] Attestations: 
 
Whitney Madera MD 
 
Please note that this dictation was completed with [employer], the computer voice recognition software. Quite often unanticipated grammatical, syntax, homophones, and other interpretive errors are inadvertently transcribed by the computer software. Please disregard these errors. Please excuse any errors that have escaped final proofreading. Thank you.

## (undated) DEVICE — GUIDEWIRE VASC L150CM DIA0.035IN FLX END L7CM J 3MM PTFE

## (undated) DEVICE — PRESSURE TUBING: Brand: TRUWAVE

## (undated) DEVICE — PTCA DILATATION CATHETER: Brand: EMERGE™

## (undated) DEVICE — VALVE HEMSTAS 9FR POLYCARB L BOR DBL Y ACCS +

## (undated) DEVICE — CATHETER ANGIO JR4 PIG 145 DEG 6 FRX100 CM MP SUPER TORQUE +

## (undated) DEVICE — SURGICAL PROCEDURE TRAY CRD CATH 3 PRT

## (undated) DEVICE — SYRINGE MED 10ML PUR GAM COMPATIBLE POLYCARB FIX M LUER CONN

## (undated) DEVICE — PINNACLE INTRODUCER SHEATH: Brand: PINNACLE

## (undated) DEVICE — SYRINGE MED 10ML RED POLYCARB BRL FIX M LUER CONN FLAT GRP

## (undated) DEVICE — HI-TORQUE BALANCE MIDDLEWEIGHT GUIDE WIRE W/HYDROCOAT .014 STRAIGHT TIP 3.0 CM X 190 CM: Brand: HI-TORQUE BALANCE MIDDLEWEIGHT

## (undated) DEVICE — SYRINGE ANGIO 20ML WHT POLYCARB VAC PRSS CAP PLUNG FIX M

## (undated) DEVICE — TOOL INSRT ANGI GDWIRE MTL SS --

## (undated) DEVICE — BOWL UTIL 16OZ STRL --

## (undated) DEVICE — DEVICE INFL SYR BLLN ENDO 30 -- INTELLI

## (undated) DEVICE — CATHETER GUID 6FR L100CM COR PERIPH BLU NYL COAT PTFE LNR 67000400] CARDINAL HEALTH CORDIS]